# Patient Record
Sex: FEMALE
[De-identification: names, ages, dates, MRNs, and addresses within clinical notes are randomized per-mention and may not be internally consistent; named-entity substitution may affect disease eponyms.]

---

## 2019-10-11 ENCOUNTER — HOSPITAL ENCOUNTER (INPATIENT)
Dept: HOSPITAL 5 - TRG | Age: 24
LOS: 2 days | Discharge: HOME | End: 2019-10-13
Attending: OBSTETRICS & GYNECOLOGY | Admitting: OBSTETRICS & GYNECOLOGY
Payer: COMMERCIAL

## 2019-10-11 DIAGNOSIS — Z3A.38: ICD-10-CM

## 2019-10-11 DIAGNOSIS — Z23: ICD-10-CM

## 2019-10-11 DIAGNOSIS — O34.211: Primary | ICD-10-CM

## 2019-10-11 LAB
BASOPHILS # (AUTO): 0.1 K/MM3 (ref 0–0.1)
BASOPHILS NFR BLD AUTO: 0.5 % (ref 0–1.8)
EOSINOPHIL # BLD AUTO: 0.1 K/MM3 (ref 0–0.4)
EOSINOPHIL NFR BLD AUTO: 0.9 % (ref 0–4.3)
HCT VFR BLD CALC: 35.8 % (ref 30.3–42.9)
HGB BLD-MCNC: 11.9 GM/DL (ref 10.1–14.3)
LYMPHOCYTES # BLD AUTO: 1.7 K/MM3 (ref 1.2–5.4)
LYMPHOCYTES NFR BLD AUTO: 12.3 % (ref 13.4–35)
MCHC RBC AUTO-ENTMCNC: 33 % (ref 30–34)
MCV RBC AUTO: 91 FL (ref 79–97)
MONOCYTES # (AUTO): 0.7 K/MM3 (ref 0–0.8)
MONOCYTES % (AUTO): 5 % (ref 0–7.3)
PLATELET # BLD: 260 K/MM3 (ref 140–440)
RBC # BLD AUTO: 3.92 M/MM3 (ref 3.65–5.03)

## 2019-10-11 PROCEDURE — 86850 RBC ANTIBODY SCREEN: CPT

## 2019-10-11 PROCEDURE — 85014 HEMATOCRIT: CPT

## 2019-10-11 PROCEDURE — 86592 SYPHILIS TEST NON-TREP QUAL: CPT

## 2019-10-11 PROCEDURE — 88307 TISSUE EXAM BY PATHOLOGIST: CPT

## 2019-10-11 PROCEDURE — 86901 BLOOD TYPING SEROLOGIC RH(D): CPT

## 2019-10-11 PROCEDURE — 86900 BLOOD TYPING SEROLOGIC ABO: CPT

## 2019-10-11 PROCEDURE — 36415 COLL VENOUS BLD VENIPUNCTURE: CPT

## 2019-10-11 PROCEDURE — 85018 HEMOGLOBIN: CPT

## 2019-10-11 PROCEDURE — 90715 TDAP VACCINE 7 YRS/> IM: CPT

## 2019-10-11 PROCEDURE — 85025 COMPLETE CBC W/AUTO DIFF WBC: CPT

## 2019-10-11 RX ADMIN — SODIUM CITRATE AND CITRIC ACID MONOHYDRATE NR ML: 500; 334 SOLUTION ORAL at 16:46

## 2019-10-11 RX ADMIN — SODIUM CITRATE AND CITRIC ACID MONOHYDRATE NR ML: 500; 334 SOLUTION ORAL at 12:21

## 2019-10-11 RX ADMIN — KETOROLAC TROMETHAMINE PRN MG: 30 INJECTION, SOLUTION INTRAMUSCULAR at 23:28

## 2019-10-11 NOTE — HISTORY AND PHYSICAL REPORT
History of Present Illness


Date of examination: 10/11/19


Date of admission: 


10/11/19 08:45





Chief complaint: 





contractions


History of present illness: 





Pt is for scheduled c/so on Monday presents c/o contractions that are frequent 

and painful and did not respond to IV hydration. As per triage RN ex pti s 2cm. 

Will proceed with repeat c/s at this time as pt is in latent labor.


Patient Name: JERRY FAY            Medical Record Number: [id number]


Date of Birth: [birth year]                                 Patient Status: Clinical


Attending Provider: JANNA VALLADARES            Account Number: [id number]


Date: 10/07/19 18:14                         Initialization Date: 10/07/19 18:14








History of Present Illness


Date of examination: 10/07/19


History of present illness: 


Past Pregnancy History 


   :      2


   Term Births:      1


   Living Children:   1


   Para:      1


   Prev :   1


   Aborta:      0





Pregnancy # 1


   Delivery date:     2014


   Weeks Gestation:   41


    labor:     no


   Delivery type:     


   Infant Sex:      Female


   Birth weight:      7#11


   Comments:      Failed IOL








Risk Factors: 





Smoked Tobacco Use:  Never smoker


Smokeless Tobacco Use:  Never


Passive smoke exposure:  no


Drug use:  no


HIV high-risk behavior:  no


Alcohol use:  no


Exercise:  no


Seatbelt use:  100 %





Dietary Counseling: pn yes








Past Medical History:


   Negative Past Medical History





Past Surgical History:


   





Past Medical History 


Surgery (Non-gyn): 





Abnormal PAP: negative





Family Hx: HTN - mother & MGM


no known hx cancer





Social Hx: Single


Homemaker


no etoh/drugs/smoking








Infection History 


Hx of STD: HPV


HIV Risk Eval: no


Hepatitis B Risk Eval: low risk


Personal hx. of genital herpes: no


Partner hx. of genital herpes: no


Rash, Viral, or Febrile illness since last LMP? no


Varicella/Chicken Pox Status: Immunized





Genetic History 


 Congenital Heart Defect:


    Mom: no  Dad: no


Canavan Disease:


    Mom: no  Dad: no


Thalassemia


    Mom: no  Dad: no


Neural Tube Defect


    Mom: no  Dad: no


Down's Syndrome


    Mom: no  Dad: no


Mingo-Sachs


    Mom: no  Dad: no


Sickle Cell Disease/Trait


    Mom: no  Dad: no


Hemophilia


    Mom: no  Dad: no


Muscular Dystrophy


    Mom: no  Dad: no


Cystic Fibrosis


    Mom: no  Dad: no


[place] Chorea


    Mom: no  Dad: no


Mental Retardation


    Mom: no  Dad: no


Fragile X


    Mom: no  Dad: no


Other Genetic/Chromosomal Disorder


    Mom: no  Dad: no


Child w/other birth defect


    Mom: no  Dad: no





Enviromental Exposures 


Xray Exposure: no


Medication, drug, or alcohol use since LMP: no


Chemical/Other Exposure: no


Exposure to Cat Liter: no


Hx of Parvovirus (Fifth Disease): no


Occupational Exposure to Children: none


Active Medications (reviewed today):


None





Current Allergies (reviewed today):


No known allergies





Physical Exam 


General appearance: well nourished, healthy appearing, no distress


Chest/Lungs: respiratory effort normal, lungs clear to auscultation


Cardiovascular: normal rate and rhythm


Abdomen/GI: soft, nontender


Extremities: no discoloration or edema








Past History





- Obstetrical History


Expected Date of Delivery: 10/21/19


Actual Gestation: 38 Week(s) 0 Day(s) 





Review of Systems


All systems: negative





Results


All other labs normal.








Assessment and Plan





- Patient Problems


(1) 39 weeks gestation of pregnancy


Status: Acute   





(2) BMI 45.0-49.9, adult


Status: Acute   





(3) Maternal care due to uterine scar from other previous surgery


Status: Acute   


Plan to address problem: 


Consent reviewed and signed. The risks and alternatives for this surgery were 

reviewed with the patient. She desires repeat  delivery. She was 

informed of possible bleeding, infection, injury to bowel, bladder, ureters or 

other adjacent organs. The patient was instructed/informed the following:


   The normal length of hospital stay for this procedure.


   Nothing to eat or drink after midnight the evening prior to surgery. 


Patient was given ample opportunity to have all her questions answered before 

signing informed consent.











Past History


Past Medical History: other (see hpi)


Past Surgical History:  section


GYN History: other (see hpi)


Family/Genetic History: other (see hpi)





- Obstetrical History


Expected Date of Delivery: 10/21/19


Actual Gestation: 38 Week(s) 4 Day(s) 


: 2


Para: 1


Number of Living Children: 1





Medications and Allergies


                                    Allergies











Allergy/AdvReac Type Severity Reaction Status Date / Time


 


No Known Allergies Allergy   Verified 10/11/19 09:56











Active Meds: 


Active Medications





Citric Acid/Sodium Citrate (Bicitra)  30 ml PO ONCE NR


   Stop: 10/11/19 15:00


   Last Admin: 10/11/19 12:21 Dose:  30 ml


   Documented by: 


Famotidine (Pepcid)  20 mg IV ONCE NR


   Stop: 10/11/19 15:00


   Last Admin: 10/11/19 12:21 Dose:  20 mg


   Documented by: 


Fentanyl (Sublimaze)  100 mcg IV ONCE ONE


   Stop: 10/11/19 13:01


   Last Admin: 10/11/19 12:35 Dose:  100 mcg


   Documented by: 


Cefazolin Sodium 3 gm/ Sodium (Chloride)  100 mls @ 100 mls/30 min IV PREOP NR; 

Protocol


   Stop: 10/11/19 15:00


Lactated Ringer's (Lactated Ringers)  1,000 mls @ 2,250 mls/hr IV PREOP KODY


   Stop: 10/12/19 12:27


   Last Admin: 10/11/19 11:46 Dose:  2,250 mls/hr


   Documented by: 


Oxytocin/Sodium Chloride (Pitocin/Ns 20 Unit/1000ml Drip)  20 units in 1,000 mls

@ 0 mls/hr IV TITR KODY


Metoclopramide HCl (Reglan)  10 mg IV ONCE NR


   Stop: 10/11/19 15:00


   Last Admin: 10/11/19 12:21 Dose:  10 mg


   Documented by: 


Mineral Oil (Mineral Oil)  30 ml PO QHS PRN


   PRN Reason: Constipation











Review of Systems


All systems: negative





- Vital Signs


Vital signs: 


                                   Vital Signs











Temp Pulse Resp BP


 


 97.6 F   90   20   134/86 


 


 10/11/19 09:01  10/11/19 09:01  10/11/19 09:01  10/11/19 09:01








                                        











Temp Pulse Resp BP Pulse Ox


 


 97.6 F   83   20   136/65    


 


 10/11/19 09:01  10/11/19 12:22  10/11/19 09:01  10/11/19 12:22   














- Physical Exam


Cardiovascular: Normal S1, Normal S2


Lungs: Positive: Normal air movement


Abdomen: Positive: normal appearance, soft.  Negative: distention, tenderness, 

guarding


Genitourinary (Female): Positive: other (see RN exam note)


Extremities: Positive: edema (trace bilateral edema).  Negative: tenderness





Results


Result Diagrams: 


                                 10/11/19 06:49





                              Abnormal lab results











  10/11/19 Range/Units





  06:49 


 


WBC  13.6 H  (4.5-11.0)  K/mm3


 


Lymph % (Auto)  12.3 L  (13.4-35.0)  %


 


Seg Neutrophils %  81.3 H  (40.0-70.0)  %


 


Seg Neutrophils #  11.0 H  (1.8-7.7)  K/mm3








All other labs normal.








Assessment and Plan





- Patient Problems


(1) Prolonged latent phase of labor


Current Visit: Yes   Status: Acute   





(2) BMI 45.0-49.9, adult


Current Visit: No   Status: Acute   





(3) Maternal care due to uterine scar from other previous surgery


Current Visit: No   Status: Acute   


Plan to address problem: 


-will proceed with  at this time. All risk, benefits and alternatives 

were d/w the pt. Questions were addressed and answered. Consents signed and 

placed on the chart

## 2019-10-11 NOTE — ANESTHESIA CONSULTATION
Anesthesia Consult and Med Hx


Date of service: 10/11/19





- Airway


Anesthetic Teeth Evaluation: Good


ROM Head & Neck: Adequate


Mental/Hyoid Distance: Adequate


Mallampati Class: Class II


Intubation Access Assessment: Good





- Pulmonary Exam


CTA: Yes





- Cardiac Exam


Cardiac Exam: RRR





- Pre-Operative Health Status


ASA Pre-Surgery Classification: ASA2, Emergency


Proposed Anesthetic Plan: Spinal





- Pulmonary


Hx Asthma: No





- Cardiovascular System


Hx Hypertension: Yes (2014 [facility])





- Central Nervous System


Hx Seizures: No


Hx Psychiatric Problems: No





- Endocrine


Hx Renal Disease: No


Hx Hypothyroidism: No


Hx Hyperthyroidism: No





- Hematic


Hx Anemia: No


Hx Sickle Cell Disease: No





- Other Systems


Hx Alcohol Use: No

## 2019-10-11 NOTE — OPERATIVE REPORT
Operative Report


Operative Report: 


Date of procedure: 10/11/2019





Pre-operative diagnosis: 38 weeks gestation


Previous  section


Latent labor





Post-operative diagnosis: Same plus meconium





Procedure name(s): Repeat low transverse  section via Pfannenstiel skin 

incision





Surgeon: Dr. Logan





Assistant: EARL





Anesthesia: Combined spinal epidural





EBL: 600 mL





Urine output: 50 mL of clear urine out at the end of procedure





Fluids: 700 mL





Findings: Liveborn female infant weight 8 lbs. 7 oz. Apgars of 7 and 9 at one 

and 5 minutes


Grossly normal fallopian tubes and ovaries bilaterally


Normal uterus





Indications: Patient presented to triage with painful regular contractions.  

Patient was examined and noted to be approximately 2 cm dilated.  Contractions 

did not resolve with IV hydration and continued to get more painful.  Decision 

was made to proceed with  delivery.  All risks benefits and alternatives

were discussed with the patient.  Consents were signed and placed on the chart. 

Patient was a planned  that was scheduled in the next 3 days.





Procedure: Patient was taking to the operating room.  Patient was then prepped 

and draped in sterile fashion after anesthesia was found to be adequate.  A low 

transverse skin incision was made with the scalpel through previous incisional 

scar and carried down to the underlying layer of fascia with the Bovie.  The 

fascia was then incised in the midline and this incision was extended 

bilaterally with the Bovie.  The superior aspect of the fascia was grasped with 

Kocher clamps tented upward and dissected off of the anterior rectus muscles 

with the scalpel.  In similar fashion the inferior aspect of the fascia was 

grasped with Kocher clamps tented upward and dissected off of the anterior 

rectus muscles.  The rectus muscles were then bluntly divided in the midline.  

The peritoneum was identified and entered into sharply.  The Neel retractor 

was placed. The bladder blade was placed.  The bladder flap was created using 

the Metzenbaum scissors.  The bladder blade was replaced.  A lower transverse 

uterine incision was made with the scalpel and extended bilaterally with the 

bandage scissors.  Artificial rupture of membranes was performed yielding thick 

meconium-stained fluid.  The infant's head was then delivered atraumatically.  

The anterior shoulder and rest of infant delivered without difficulty.  The 

umbilical cord was clamped x2.  The cord was cut.  The infant was then placed in

sterile bassinet.  The cord blood was collected.  The placenta was manually 

extracted in its entirety.  The uterus was exteriorized and cleared of all clots

and debris.  The uterine incision was closed using 0 Vicryl in a running locking

fashion.  A second imbricating layer of the same suture was then created.  The 

posterior cul-de-sac was copiously irrigated.  The uterus was returned to the 

abdomen.  The gutters were also irrigated.  The anterior rectus muscles were 

reapproximated using 3-0 Vicryl.  The anterior rectus fascia was reapproximated 

using 0 Vicryl in a running fashion.  The subcuticular fat was reapproximated 

using 2-0 Vicryl in a running fashion.  The skin was reapproximated with 4-0 

Monocryl in a subcuticular stitch.  Dermabond was placed over the skin incision.

 Excellent hemostasis was noted.  The patient tolerated the procedure well.  

Sponge lap and needle counts were all correct x3.  Patient was taken to the 

recovery room awake and in stable condition.

## 2019-10-12 LAB
HCT VFR BLD CALC: 30.7 % (ref 30.3–42.9)
HGB BLD-MCNC: 10.5 GM/DL (ref 10.1–14.3)

## 2019-10-12 PROCEDURE — 3E0234Z INTRODUCTION OF SERUM, TOXOID AND VACCINE INTO MUSCLE, PERCUTANEOUS APPROACH: ICD-10-PCS | Performed by: OBSTETRICS & GYNECOLOGY

## 2019-10-12 RX ADMIN — HYDROCODONE BITARTRATE AND ACETAMINOPHEN PRN EACH: 5; 325 TABLET ORAL at 14:21

## 2019-10-12 RX ADMIN — IBUPROFEN PRN MG: 800 TABLET, FILM COATED ORAL at 11:28

## 2019-10-12 RX ADMIN — KETOROLAC TROMETHAMINE PRN MG: 30 INJECTION, SOLUTION INTRAMUSCULAR at 05:15

## 2019-10-12 RX ADMIN — HYDROCODONE BITARTRATE AND ACETAMINOPHEN PRN EACH: 5; 325 TABLET ORAL at 18:34

## 2019-10-12 RX ADMIN — IBUPROFEN PRN MG: 800 TABLET, FILM COATED ORAL at 21:49

## 2019-10-12 RX ADMIN — HYDROCODONE BITARTRATE AND ACETAMINOPHEN PRN EACH: 5; 325 TABLET ORAL at 07:54

## 2019-10-12 NOTE — PROGRESS NOTE
Assessment and Plan





- Patient Problems


(1)  delivery delivered


Current Visit: Yes   Status: Acute   


Plan to address problem: 


Doing well, continue post c/s pathway








Subjective





- Subjective


Date of service: 10/12/19


Principal diagnosis: POD#1 LTCS


Interval history: 





No complaints, minimal lochia


Patient reports: appetite normal, voiding normally, pain well controlled, flatus





Objective





- Vital Signs


Latest vital signs: 


                                   Vital Signs











  Temp Pulse Resp BP BP Pulse Ox


 


 10/12/19 04:58  98.3 F  87  18  117/70   94


 


 10/12/19 00:49  98.4 F  90  20  115/70   96


 


 10/11/19 19:44  98.4 F  79  20  112/56   98


 


 10/11/19 18:55  98.6 F     


 


 10/11/19 18:53   70  16   108/58  96


 


 10/11/19 18:36   81  17   96/46  97


 


 10/11/19 18:21   87  14   92/53  97


 


 10/11/19 18:18   85  16   94/49  98


 


 10/11/19 18:15   77  12   105/54  97


 


 10/11/19 18:12   74  20   99/54  97


 


 10/11/19 18:09   79  12   97/49  97


 


 10/11/19 18:03  98.5 F  73  12   92/50  97


 


 10/11/19 12:22   83   136/65  


 


 10/11/19 12:20   90   149/72  


 


 10/11/19 09:01  97.6 F  90  20  134/86  134/86 








                                Intake and Output











 10/11/19 10/12/19 10/12/19





 22:59 06:59 14:59


 


Intake Total 1150 480 


 


Output Total 650  


 


Balance 500 480 


 


Intake:   


 


  IV 1150  


 


  Intake, Free Water  480 


 


Output:   


 


  Urine 650  


 


    Uretheral (Bravo) 300  


 


Other:   


 


  # Voids   


 


    Void  1 


 


  Estimated Blood Loss 600  














- Exam


Breasts: Present: normal.  Absent: swelling, engorged


Cardiovascular: Present: Regular rate


Lungs: Present: Clear to auscultation, Normal air movement


Abdomen: Present: soft, normal bowel sounds.  Absent: tenderness


Uterus: Present: firm, fundal height below umbilicus


Extremities: Present: normal.  Absent: tenderness, edema


Incision: Present: dressed





- Labs


Labs: 


                              Abnormal lab results











  10/11/19 Range/Units





  06:49 


 


WBC  13.6 H  (4.5-11.0)  K/mm3


 


Lymph % (Auto)  12.3 L  (13.4-35.0)  %


 


Seg Neutrophils %  81.3 H  (40.0-70.0)  %


 


Seg Neutrophils #  11.0 H  (1.8-7.7)  K/mm3

## 2019-10-13 VITALS — DIASTOLIC BLOOD PRESSURE: 71 MMHG | SYSTOLIC BLOOD PRESSURE: 114 MMHG

## 2019-10-13 RX ADMIN — HYDROCODONE BITARTRATE AND ACETAMINOPHEN PRN EACH: 5; 325 TABLET ORAL at 02:40

## 2019-10-13 RX ADMIN — HYDROCODONE BITARTRATE AND ACETAMINOPHEN PRN EACH: 5; 325 TABLET ORAL at 08:30

## 2019-10-13 RX ADMIN — IBUPROFEN PRN MG: 800 TABLET, FILM COATED ORAL at 07:32

## 2019-10-13 RX ADMIN — HYDROCODONE BITARTRATE AND ACETAMINOPHEN PRN EACH: 5; 325 TABLET ORAL at 12:55
